# Patient Record
Sex: MALE | Race: WHITE
[De-identification: names, ages, dates, MRNs, and addresses within clinical notes are randomized per-mention and may not be internally consistent; named-entity substitution may affect disease eponyms.]

---

## 2020-01-08 ENCOUNTER — HOSPITAL ENCOUNTER (EMERGENCY)
Dept: HOSPITAL 56 - MW.ED | Age: 30
Discharge: HOME | End: 2020-01-08
Payer: SELF-PAY

## 2020-01-08 DIAGNOSIS — F17.210: ICD-10-CM

## 2020-01-08 DIAGNOSIS — S90.02XA: Primary | ICD-10-CM

## 2020-01-08 DIAGNOSIS — Z88.0: ICD-10-CM

## 2020-01-08 NOTE — CR
Left ankle: 3 views left ankle were obtained.

 

Comparison: No prior left ankle exam.

 

Soft tissue swelling is identified.  Well-corticated bony density is 

noted off the medial malleolus compatible with old injury.  No acute 

fracture, dislocation or other bony abnormality is seen.

 

Impression:

1.  Soft tissue swelling primarily off the lateral ankle.

2.  Old injury off the medial malleolus.

3.  No acute bony abnormality is appreciated.

 

Diagnostic code #2

 

This report was dictated in [place] Standard Time

## 2020-01-08 NOTE — EDM.PDOC
ED HPI GENERAL MEDICAL PROBLEM





- General


Chief Complaint: Lower Extremity Injury/Pain


Stated Complaint: ANKLE SPRAIN


Time Seen by Provider: 01/08/20 18:32


Source of Information: Reports: Patient


History Limitations: Reports: No Limitations





- History of Present Illness


INITIAL COMMENTS - FREE TEXT/NARRATIVE: 


HISTORY AND PHYSICAL:





History of present illness:


Patient is a 29-year-old male who presents to the emergency room with 

complaints of left lateral ankle pain.  He states last evening he rolled his 

ankle and since that time has been doing supportive care measures at home 

without any relief.  He denies any numbness, tingling, saddle paresthesia.  He 

is able to bear weight and ambulate although this is painful.  He denies any 

other extremity involvement and offers no systemic complaints.





Review of systems: 


As per history of present illness and below otherwise all systems reviewed and 

negative.





Past medical history: 


As per history of present illness and as reviewed below otherwise 

noncontributory.





Surgical history: 


As per history of present illness and as reviewed below otherwise 

noncontributory.





Social history: 


See social history for further information





Family history: 


As per history of present illness and as reviewed below otherwise 

noncontributory.





Physical exam:


General: Well-developed and well-nourished 29-year-old male.  Alert and 

oriented.  Nontoxic-appearing and in no acute distress.


HEENT: Atraumatic, normocephalic, pupils equal and reactive bilaterally, 

negative for conjunctival pallor or scleral icterus, mucous membranes moist, 

trachea midline. No drooling or trismus noted. No meningeal signs. No hot 

potato voice noted. 


Lungs: Clear to auscultation, breath sounds equal bilaterally, chest nontender.


Heart: S1S2, regular rate and rhythm without overt murmur


Abdomen: Soft, nondistended, nontender. 


Skin: Intact, warm, dry. No lesions or rashes noted.


Extremities: Pain to the left lateral malleolus with soft tissue swelling and 

early bruising.  He is able to ambulate and bear weight.  Moves all extremities 

per self without difficulty or deficits, negative for cords or calf pain. 

Neurovascular unremarkable.


Neuro: Awake, alert, oriented. Cranial nerves II through XII unremarkable. 

Cerebellum unremarkable. Motor and sensory unremarkable throughout. Exam 

nonfocal.





Notes:


X-ray shows soft tissue swelling primarily off the lateral ankle.  Old injury 

of the medial malleolus.  No acute bony abnormalities are noted.  CAM Walker 

boot and crutches were given with education.  Encouraged him to follow-up with 

an orthopedic provider.  Supportive care measures were reviewed and discussed. 

Voices understanding and is agreeable to plan of care. Denies any further 

questions or concerns at this time.





Diagnostics:


X-ray left ankle





Therapeutics:


CAM Walker boot, crutches





Prescription:


Tylenol #3 (#15)





Impression: 


Left Ankle Injury





Plan:


1. Rest, ice, elevate the affected extremity. Please wear the splint as 

directed.


2. Tylenol and/or Ibuprofen as needed for pain management. 


3. Follow up with the Orthopedic provider as we discussed. Return to the ED as 

needed and as discussed.





Definitive disposition and diagnosis as appropriate pending reevaluation and 

review of above.





  ** Left Ankle


Pain Score (Numeric/FACES): 8





- Related Data


 Allergies











Allergy/AdvReac Type Severity Reaction Status Date / Time


 


Penicillins Allergy  Other Verified 01/08/20 18:28











Home Meds: 


 Home Meds





. [No Known Home Meds]  01/08/20 [History]











Past Medical History





- Infectious Disease History


Infectious Disease History: Reports: Chicken Pox





- Past Surgical History


HEENT Surgical History: Reports: Tonsillectomy





Social & Family History





- Family History


Family Medical History: Noncontributory





- Tobacco Use


Smoking Status *Q: Current Every Day Smoker


Years of Tobacco use: 12


Packs/Tins Daily: 0.5





- Caffeine Use


Caffeine Use: Reports: Coffee





- Recreational Drug Use


Recreational Drug Use: No





Review of Systems





- Review of Systems


Review Of Systems: Comprehensive ROS is negative, except as noted in HPI.





ED EXAM, GENERAL





- Physical Exam


Exam: See Below (See dictation)





Course





- Vital Signs


Last Recorded V/S: 


 Last Vital Signs











Temp  96.9 F   01/08/20 18:29


 


Pulse  106 H  01/08/20 18:29


 


Resp  16   01/08/20 18:29


 


BP  150/97 H  01/08/20 18:29


 


Pulse Ox  96   01/08/20 18:29














- Orders/Labs/Meds


Orders: 


 Active Orders 24 hr











 Category Date Time Status


 


 DME for Discharge [COMM] Stat Oth  01/08/20 18:45 Ordered














Departure





- Departure


Time of Disposition: 19:08


Disposition: Home, Self-Care 01


Clinical Impression: 


Ankle injury


Qualifiers:


 Encounter type: initial encounter Laterality: left Qualified Code(s): S99.912A 

- Unspecified injury of left ankle, initial encounter








- Discharge Information


Referrals: 


PCP,None [Primary Care Provider] - 


Forms:  ED Department Discharge


Additional Instructions: 


The following information is given to patients seen in the emergency department 

who are being discharged to home. This information is to outline your options 

for follow-up care. We provide all patients seen in our emergency department 

with a follow-up referral.





The need for follow-up, as well as the timing and circumstances, are variable 

depending upon the specifics of your emergency department visit.





If you don't have a primary care physician on staff, we will provide you with a 

referral. We always advise you to contact your personal physician following an 

emergency department visit to inform them of the circumstance of the visit and 

for follow-up with them and/or the need for any referrals to a consulting 

specialist.





The emergency department will also refer you to a specialist when appropriate. 

This referral assures that you have the opportunity for follow-up care with a 

specialist. All of these measure are taken in an effort to provide you with 

optimal care, which includes your follow-up.





Under all circumstances we always encourage you to contact your private 

physician who remains a resource for coordinating your care. When calling for 

follow-up care, please make the office aware that this follow-up is from your 

recent emergency room visit. If for any reason you are refused follow-up, 

please contact the [facility] Emergency 

Department at [phone] and asked to speak to the emergency department 

charge nurse.





[facility]


Primary Care


[address] [place] [id number]


Phone: [phone]


Fax: [phone]





[facility]


[address] [place] [id number]


Phone: [phone]


Fax: [phone]





1. Rest, ice, elevate the affected extremity. Please wear the splint and 

crutches as directed.


2. Tylenol #3 and/or Ibuprofen as needed for pain management. 


3. Follow up with the Orthopedic provider as we discussed. Return to the ED as 

needed and as discussed.





Sepsis Event Note





- Evaluation


Sepsis Screening Result: No Definite Risk





- Focused Exam


Vital Signs: 


 Vital Signs











  Temp Pulse Resp BP Pulse Ox


 


 01/08/20 18:29  96.9 F  106 H  16  150/97 H  96











Date Exam was Performed: 01/08/20


Time Exam was Performed: 19:07





- My Orders


Last 24 Hours: 


My Active Orders





01/08/20 18:45


DME for Discharge [COMM] Stat 














- Assessment/Plan


Last 24 Hours: 


My Active Orders





01/08/20 18:45


DME for Discharge [COMM] Stat